# Patient Record
Sex: MALE | Race: WHITE | Employment: OTHER | URBAN - METROPOLITAN AREA
[De-identification: names, ages, dates, MRNs, and addresses within clinical notes are randomized per-mention and may not be internally consistent; named-entity substitution may affect disease eponyms.]

---

## 2022-02-10 DIAGNOSIS — M25.561 RIGHT KNEE PAIN, UNSPECIFIED CHRONICITY: Primary | ICD-10-CM

## 2022-02-11 ENCOUNTER — OFFICE VISIT (OUTPATIENT)
Dept: ORTHOPEDIC SURGERY | Age: 82
End: 2022-02-11
Payer: MEDICARE

## 2022-02-11 VITALS — HEIGHT: 69 IN | BODY MASS INDEX: 30.21 KG/M2 | WEIGHT: 204 LBS

## 2022-02-11 DIAGNOSIS — M25.561 RIGHT KNEE PAIN, UNSPECIFIED CHRONICITY: Primary | ICD-10-CM

## 2022-02-11 DIAGNOSIS — M17.11 OSTEOARTHRITIS OF RIGHT KNEE, UNSPECIFIED OSTEOARTHRITIS TYPE: ICD-10-CM

## 2022-02-11 DIAGNOSIS — M25.561 RIGHT KNEE PAIN, UNSPECIFIED CHRONICITY: ICD-10-CM

## 2022-02-11 PROBLEM — Z96.1 PSEUDOPHAKIA: Status: ACTIVE | Noted: 2017-07-10

## 2022-02-11 PROBLEM — H52.4 HYPEROPIA OF BOTH EYES WITH ASTIGMATISM AND PRESBYOPIA: Status: ACTIVE | Noted: 2021-09-22

## 2022-02-11 PROBLEM — H04.129 TEAR FILM INSUFFICIENCY: Status: ACTIVE | Noted: 2017-02-17

## 2022-02-11 PROBLEM — M54.16 LUMBAR RADICULOPATHY: Status: ACTIVE | Noted: 2017-11-07

## 2022-02-11 PROBLEM — H43.813 POSTERIOR VITREOUS DETACHMENT OF BOTH EYES: Status: ACTIVE | Noted: 2017-07-10

## 2022-02-11 PROBLEM — Z95.818 PRESENCE OF WATCHMAN LEFT ATRIAL APPENDAGE CLOSURE DEVICE: Status: ACTIVE | Noted: 2021-10-04

## 2022-02-11 PROBLEM — E66.9 OBESITY: Status: ACTIVE | Noted: 2021-07-12

## 2022-02-11 PROBLEM — H52.203 HYPEROPIA OF BOTH EYES WITH ASTIGMATISM AND PRESBYOPIA: Status: ACTIVE | Noted: 2021-09-22

## 2022-02-11 PROBLEM — H26.492 LEFT POSTERIOR CAPSULAR OPACIFICATION: Status: ACTIVE | Noted: 2018-07-26

## 2022-02-11 PROBLEM — M54.30 SCIATIC LEG PAIN: Status: ACTIVE | Noted: 2017-06-30

## 2022-02-11 PROBLEM — H52.03 HYPEROPIA OF BOTH EYES WITH ASTIGMATISM AND PRESBYOPIA: Status: ACTIVE | Noted: 2021-09-22

## 2022-02-11 PROBLEM — H52.4 ASTIGMATISM WITH PRESBYOPIA: Status: ACTIVE | Noted: 2017-02-17

## 2022-02-11 PROBLEM — H52.209 ASTIGMATISM WITH PRESBYOPIA: Status: ACTIVE | Noted: 2017-02-17

## 2022-02-11 PROBLEM — M19.90 OA (OSTEOARTHRITIS): Status: ACTIVE | Noted: 2021-12-06

## 2022-02-11 PROBLEM — J98.4 RESTRICTIVE LUNG DISEASE: Status: ACTIVE | Noted: 2020-08-11

## 2022-02-11 PROBLEM — C44.90 MALIGNANT NEOPLASM OF SKIN: Status: ACTIVE | Noted: 2021-12-06

## 2022-02-11 PROBLEM — J61 ASBESTOSIS (HCC): Status: ACTIVE | Noted: 2021-12-06

## 2022-02-11 PROBLEM — N52.8 OTHER MALE ERECTILE DYSFUNCTION: Status: ACTIVE | Noted: 2021-03-04

## 2022-02-11 PROCEDURE — G8536 NO DOC ELDER MAL SCRN: HCPCS | Performed by: ORTHOPAEDIC SURGERY

## 2022-02-11 PROCEDURE — G8427 DOCREV CUR MEDS BY ELIG CLIN: HCPCS | Performed by: ORTHOPAEDIC SURGERY

## 2022-02-11 PROCEDURE — G8510 SCR DEP NEG, NO PLAN REQD: HCPCS | Performed by: ORTHOPAEDIC SURGERY

## 2022-02-11 PROCEDURE — 99204 OFFICE O/P NEW MOD 45 MIN: CPT | Performed by: ORTHOPAEDIC SURGERY

## 2022-02-11 PROCEDURE — G8417 CALC BMI ABV UP PARAM F/U: HCPCS | Performed by: ORTHOPAEDIC SURGERY

## 2022-02-11 PROCEDURE — G8756 NO BP MEASURE DOC: HCPCS | Performed by: ORTHOPAEDIC SURGERY

## 2022-02-11 PROCEDURE — 1101F PT FALLS ASSESS-DOCD LE1/YR: CPT | Performed by: ORTHOPAEDIC SURGERY

## 2022-02-11 RX ORDER — ASCORBIC ACID 500 MG
500 TABLET ORAL DAILY
COMMUNITY

## 2022-02-11 RX ORDER — MELATONIN
1000 DAILY
COMMUNITY

## 2022-02-11 RX ORDER — TELMISARTAN 40 MG/1
40 TABLET ORAL DAILY
COMMUNITY
Start: 2021-03-31

## 2022-02-11 RX ORDER — INDOMETHACIN 25 MG/1
25 CAPSULE ORAL AS NEEDED
COMMUNITY

## 2022-02-11 RX ORDER — CYANOCOBALAMIN (VITAMIN B-12) 250 MCG
250 TABLET ORAL DAILY
COMMUNITY

## 2022-02-11 RX ORDER — ALLOPURINOL 100 MG/1
100 TABLET ORAL DAILY
COMMUNITY
Start: 2021-05-28

## 2022-02-11 RX ORDER — SILDENAFIL 25 MG/1
1 TABLET, FILM COATED ORAL
COMMUNITY
Start: 2021-03-16

## 2022-02-11 RX ORDER — CLOPIDOGREL BISULFATE 75 MG/1
75 TABLET ORAL DAILY
COMMUNITY
Start: 2021-09-28

## 2022-02-11 RX ORDER — METOPROLOL TARTRATE 50 MG/1
50 TABLET ORAL DAILY
COMMUNITY

## 2022-02-11 RX ORDER — ASPIRIN 81 MG/1
1 TABLET ORAL
COMMUNITY
Start: 2021-09-28

## 2022-02-11 RX ORDER — LANOLIN ALCOHOL/MO/W.PET/CERES
400 CREAM (GRAM) TOPICAL DAILY
COMMUNITY

## 2022-02-11 RX ORDER — METOPROLOL SUCCINATE 50 MG/1
50 TABLET, EXTENDED RELEASE ORAL DAILY
COMMUNITY
Start: 2022-01-06

## 2022-02-11 RX ORDER — POLYETHYLENE GLYCOL 400 AND PROPYLENE GLYCOL 4; 3 MG/ML; MG/ML
1 SOLUTION/ DROPS OPHTHALMIC 2 TIMES DAILY
COMMUNITY

## 2022-02-11 RX ORDER — SIMVASTATIN 20 MG/1
10 TABLET, FILM COATED ORAL
COMMUNITY

## 2022-02-11 RX ORDER — TRIAMCINOLONE ACETONIDE 1 MG/G
CREAM TOPICAL
COMMUNITY
Start: 2021-10-07

## 2022-02-11 RX ORDER — GABAPENTIN 100 MG/1
1 CAPSULE ORAL
COMMUNITY
Start: 2021-07-22

## 2022-02-11 RX ORDER — VALACYCLOVIR HYDROCHLORIDE 500 MG/1
1 TABLET, FILM COATED ORAL DAILY
COMMUNITY
Start: 2021-07-12

## 2022-02-11 NOTE — PROGRESS NOTES
Name: Samia Barraza    : 1940     Service Dept: [address] [facility]    Chief Complaint   Patient presents with    Knee Pain        Visit Vitals  Ht 5' 9\" (1.753 m)   Wt 204 lb (92.5 kg)   BMI 30.13 kg/m²        Allergies   Allergen Reactions    Hydromorphone Other (comments)     Pt had a past episode where he was overmedicated with Dilaudid and needed Narcan to recover. Pt does not want to take Dilaudid. Vision disturbance        Bee Pollen Other (comments)        Current Outpatient Medications   Medication Sig Dispense Refill    metoprolol tartrate (LOPRESSOR) 50 mg tablet Take 50 mg by mouth daily.  metoprolol succinate (TOPROL-XL) 50 mg XL tablet Take 50 mg by mouth daily.  magnesium oxide (MAG-OX) 400 mg tablet Take 400 mg by mouth daily.  indomethacin (INDOCIN) 25 mg capsule Take 25 mg by mouth as needed.  gabapentin (NEURONTIN) 100 mg capsule Take 1 Capsule by mouth.  cyanocobalamin (VITAMIN B12) 250 mcg tablet Take 250 mcg by mouth daily.  clopidogreL (PLAVIX) 75 mg tab Take 75 mg by mouth daily.  cholecalciferol (VITAMIN D3) (1000 Units /25 mcg) tablet Take 1,000 Units by mouth daily.  aspirin delayed-release 81 mg tablet Take 1 Tablet by mouth.  ascorbic acid, vitamin C, (VITAMIN C) 500 mg tablet Take 500 mg by mouth daily.  allopurinoL (ZYLOPRIM) 100 mg tablet Take 100 mg by mouth daily.  peg 400-propylene glycol (Systane, propylene glycoL,) 0.4-0.3 % drop 1 Drop two (2) times a day.  sennosides 17.2 mg tab Take  by mouth two (2) times a day.  Psyllium Husk-Sucrose 3.4 gram/7 gram powd Take 1 Packet by mouth daily.  sildenafil citrate (VIAGRA) 25 mg tablet Take 1 Tablet by mouth.  simvastatin (ZOCOR) 20 mg tablet Take 10 mg by mouth nightly.  telmisartan (MICARDIS) 40 mg tablet Take 40 mg by mouth daily.       tiotropium (SPIRIVA) 18 mcg inhalation capsule Take 1 Capsule by inhalation daily.  triamcinolone acetonide (KENALOG) 0.1 % topical cream APPLY TOPICALLY TO THE AFFECTED AREA OF BACK, ARMS, AND LEGS TWICE A DAY AS NEEDED      valACYclovir (VALTREX) 500 mg tablet Take 1 Tablet by mouth daily. Patient Active Problem List   Diagnosis Code    Appendicitis K37    Alcohol abuse F10.10    Asbestos exposure Z77.090    Asbestosis ([facility] J61    Asthma J45.909    Astigmatism with presbyopia H52.209, H52.4    Chronic lymphocytic leukemia ([facility] C91.10    Colon polyp K63.5    Diverticulosis of colon K57.30    Epistaxis R04.0    Gout M10.9    Hematochezia K92.1    Hyperopia of both eyes with astigmatism and presbyopia H52.03, H52.203, H52.4    Hypertension I10    Increased glucose level R73.09    Insomnia G47.00    Left posterior capsular opacification H26.492    Lumbar radiculopathy M54.16    Malignant neoplasm of skin C44.90    Memory impairment R41.3    Microcytic anemia D50.9    Mixed hyperlipidemia E78.2    OA (osteoarthritis) M19.90    Obesity E66.9    Obstructive sleep apnea syndrome G47.33    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 No pertinent family history.    Social History     Socioeconomic History    Marital status:    Tobacco Use    Smoking status: Former Smoker    Smokeless tobacco: Never Used   Substance and Sexual Activity    Alcohol use: Yes      History reviewed. No pertinent surgical history. Past Medical History:   Diagnosis Date    Cancer ([address]     High cholesterol     Hypertension         I have reviewed and agree with [address] [place] [address] Nw and ROS and intake form in chart and the record furthermore I have reviewed prior medical record(s) regarding this patients care during this appointment. Review of Systems:   Patient is a pleasant appearing individual, appropriately dressed, well hydrated, well nourished, who is alert, appropriately oriented for age, and in no acute distress with a normal gait and normal affect who does not appear to be in any significant pain. Physical Exam:  Right Knee -Decrease range of motion with flexion, Knee arc of greater than 50 degrees, Some crepitation, Grossly neurovascularly intact, Good cap refill, No skin lesion, Moderate swelling, No gross instability, Some quadriceps weakness, Kellgren and Robb at least grade 3    Left Knee - Full Range of Motion, No crepitation, Grossly neurovascularly intact, Good cap refill, No skin lesion, No swelling, No gross instability, No quadriceps weakness       Encounter Diagnoses     ICD-10-CM ICD-9-CM   1. Right knee pain, unspecified chronicity  M25.561 719.46   2. Osteoarthritis of right knee, unspecified osteoarthritis type  M17.11 715.96       HPI:  The patient is here with a chief complaint of right knee pain, throbbing, burning pain. It has been the same. Pain is 8/10. X-rays of the right knee are positive for severe OA. Continues to have difficulty with it. Assessment/Plan:  Plan will be for right total knee replacement, general medical and cardiac clearance, and we will go from there. As part of continued conservative pain management options the patient was advised to utilize Tylenol or OTC NSAIDS as long as it is not medically contraindicated. Return to Office: Follow-up and Dispositions    · Return for schedule for surgery.            Scribed by Ian Carlson LPN as dictated by Cam Rosado MD.  Documentation True and Accepted Cam Rosado MD

## 2022-02-11 NOTE — PATIENT INSTRUCTIONS
Knee Arthritis: Care Instructions  Your Care Instructions     Knee arthritis is a breakdown of the cartilage that cushions your knee joint. When the cartilage wears down, your bones rub against each other. This causes pain and stiffness. Knee arthritis tends to get worse with time. Treatment for knee arthritis involves reducing pain, making the leg muscles stronger, and staying at a healthy body weight. The treatment usually does not improve the health of the cartilage, but it can reduce pain and improve how well your knee works. You can take simple measures to protect your knee joints, ease your pain, and help you stay active. Follow-up care is a key part of your treatment and safety. Be sure to make and go to all appointments, and call your doctor if you are having problems. It's also a good idea to know your test results and keep a list of the medicines you take. How can you care for yourself at home? · Know that knee arthritis will cause more pain on some days than on others. · Stay at a healthy weight. Lose weight if you are overweight. When you stand up, the pressure on your knees from every pound of body weight is multiplied four times. So if you lose 10 pounds, you will reduce the pressure on your knees by 40 pounds. · Talk to your doctor or physical therapist about exercises that will help ease joint pain. ? Stretch to help prevent stiffness and to prevent injury before you exercise. You may enjoy gentle forms of yoga to help keep your knee joints and muscles flexible. ? Walk instead of jog.  ? Ride a bike. This makes your thigh muscles stronger and takes pressure off your knee. ? Wear well-fitting and comfortable shoes. ? Exercise in chest-deep water. This can help you exercise longer with less pain. ? Avoid exercises that include squatting or kneeling. They can put a lot of strain on your knees.   ? Talk to your doctor to make sure that the exercise you do is not making the arthritis worse.  · Do not sit for long periods of time. Try to walk once in a while to keep your knee from getting stiff. · Ask your doctor or physical therapist whether shoe inserts may reduce your arthritis pain. · If you can afford it, get new athletic shoes at least every year. This can help reduce the strain on your knees. · Use a device to help you do everyday activities. ? A cane or walking stick can help you keep your balance when you walk. Hold the cane or walking stick in the hand opposite the painful knee. ? If you feel like you may fall when you walk, try using crutches or a front-wheeled walker. These can prevent falls that could cause more damage to your knee. ? A knee brace may help keep your knee stable and prevent pain. ? You also can use other things to make life easier, such as a higher toilet seat and handrails in the bathtub or shower. · Take pain medicines exactly as directed. ? Do not wait until you are in severe pain. You will get better results if you take it sooner. ? If you are not taking a prescription pain medicine, take an over-the-counter medicine such as acetaminophen (Tylenol), ibuprofen (Advil, Motrin), or naproxen (Aleve). Read and follow all instructions on the label. ? Do not take two or more pain medicines at the same time unless the doctor told you to. Many pain medicines have acetaminophen, which is Tylenol. Too much acetaminophen (Tylenol) can be harmful. ? Tell your doctor if you take a blood thinner, have diabetes, or have allergies to shellfish. · Ask your doctor if you might benefit from a shot of steroid medicine into your knee. This may provide pain relief for several months. · Many people take the supplements glucosamine and chondroitin for osteoarthritis. Some people feel they help, but the medical research does not show that they work. Talk to your doctor before you take these supplements. When should you call for help?    Call your doctor now or seek immediate medical care if:    · You have sudden swelling, warmth, or pain in your knee.     · You have knee pain and a fever or rash.     · You have such bad pain that you cannot use your knee. Watch closely for changes in your health, and be sure to contact your doctor if you have any problems. Where can you learn more? Go to http://www.gray.com/  Enter W187 in the search box to learn more about \"Knee Arthritis: Care Instructions. \"  Current as of: April 30, 2021               Content Version: 13.0  © 2572-8731 [employer]. Care instructions adapted under license by [employer] (which disclaims liability or warranty for this information). If you have questions about a medical condition or this instruction, always ask your healthcare professional. [address] any warranty or liability for your use of this information.

## 2022-02-14 DIAGNOSIS — M25.561 RIGHT KNEE PAIN, UNSPECIFIED CHRONICITY: ICD-10-CM
